# Patient Record
Sex: MALE | Race: BLACK OR AFRICAN AMERICAN
[De-identification: names, ages, dates, MRNs, and addresses within clinical notes are randomized per-mention and may not be internally consistent; named-entity substitution may affect disease eponyms.]

---

## 2021-01-27 ENCOUNTER — HOSPITAL ENCOUNTER (EMERGENCY)
Dept: HOSPITAL 62 - ER | Age: 56
Discharge: HOME | End: 2021-01-27
Payer: COMMERCIAL

## 2021-01-27 VITALS — DIASTOLIC BLOOD PRESSURE: 78 MMHG | SYSTOLIC BLOOD PRESSURE: 173 MMHG

## 2021-01-27 DIAGNOSIS — F17.200: ICD-10-CM

## 2021-01-27 DIAGNOSIS — L03.011: Primary | ICD-10-CM

## 2021-01-27 LAB
ADD MANUAL DIFF: NO
ALBUMIN SERPL-MCNC: 4.7 G/DL (ref 3.5–5)
ALP SERPL-CCNC: 96 U/L (ref 38–126)
ANION GAP SERPL CALC-SCNC: 9 MMOL/L (ref 5–19)
AST SERPL-CCNC: 45 U/L (ref 17–59)
BASE EXCESS BLDV CALC-SCNC: 2.2 MMOL/L
BASOPHILS # BLD AUTO: 0 10^3/UL (ref 0–0.2)
BASOPHILS NFR BLD AUTO: 0.5 % (ref 0–2)
BILIRUB DIRECT SERPL-MCNC: 0.2 MG/DL (ref 0–0.4)
BILIRUB SERPL-MCNC: 0.8 MG/DL (ref 0.2–1.3)
BUN SERPL-MCNC: 9 MG/DL (ref 7–20)
CALCIUM: 9.7 MG/DL (ref 8.4–10.2)
CHLORIDE SERPL-SCNC: 100 MMOL/L (ref 98–107)
CO2 SERPL-SCNC: 28 MMOL/L (ref 22–30)
EOSINOPHIL # BLD AUTO: 0 10^3/UL (ref 0–0.6)
EOSINOPHIL NFR BLD AUTO: 0.1 % (ref 0–6)
ERYTHROCYTE [DISTWIDTH] IN BLOOD BY AUTOMATED COUNT: 13.8 % (ref 11.5–14)
GLUCOSE SERPL-MCNC: 112 MG/DL (ref 75–110)
HCO3 BLDV-SCNC: 29.1 MMOL/L (ref 20–32)
HCT VFR BLD CALC: 47.2 % (ref 37.9–51)
HGB BLD-MCNC: 15.6 G/DL (ref 13.5–17)
INR PPP: 0.96
LYMPHOCYTES # BLD AUTO: 1.6 10^3/UL (ref 0.5–4.7)
LYMPHOCYTES NFR BLD AUTO: 22.5 % (ref 13–45)
MCH RBC QN AUTO: 28.9 PG (ref 27–33.4)
MCHC RBC AUTO-ENTMCNC: 33 G/DL (ref 32–36)
MCV RBC AUTO: 88 FL (ref 80–97)
MONOCYTES # BLD AUTO: 0.7 10^3/UL (ref 0.1–1.4)
MONOCYTES NFR BLD AUTO: 9.6 % (ref 3–13)
NEUTROPHILS # BLD AUTO: 4.7 10^3/UL (ref 1.7–8.2)
NEUTS SEG NFR BLD AUTO: 67.3 % (ref 42–78)
PCO2 BLDV: 53.4 MMHG (ref 35–63)
PH BLDV: 7.35 [PH] (ref 7.3–7.42)
PLATELET # BLD: 211 10^3/UL (ref 150–450)
POTASSIUM SERPL-SCNC: 4.1 MMOL/L (ref 3.6–5)
PROT SERPL-MCNC: 8.3 G/DL (ref 6.3–8.2)
PROTHROMBIN TIME: 13 SEC (ref 11.4–15.4)
RBC # BLD AUTO: 5.4 10^6/UL (ref 4.35–5.55)
TOTAL CELLS COUNTED % (AUTO): 100 %
WBC # BLD AUTO: 6.9 10^3/UL (ref 4–10.5)

## 2021-01-27 PROCEDURE — 83605 ASSAY OF LACTIC ACID: CPT

## 2021-01-27 PROCEDURE — 99285 EMERGENCY DEPT VISIT HI MDM: CPT

## 2021-01-27 PROCEDURE — 85610 PROTHROMBIN TIME: CPT

## 2021-01-27 PROCEDURE — 10060 I&D ABSCESS SIMPLE/SINGLE: CPT

## 2021-01-27 PROCEDURE — 82803 BLOOD GASES ANY COMBINATION: CPT

## 2021-01-27 PROCEDURE — 93010 ELECTROCARDIOGRAM REPORT: CPT

## 2021-01-27 PROCEDURE — 80053 COMPREHEN METABOLIC PANEL: CPT

## 2021-01-27 PROCEDURE — 82962 GLUCOSE BLOOD TEST: CPT

## 2021-01-27 PROCEDURE — 36415 COLL VENOUS BLD VENIPUNCTURE: CPT

## 2021-01-27 PROCEDURE — 73130 X-RAY EXAM OF HAND: CPT

## 2021-01-27 PROCEDURE — 93005 ELECTROCARDIOGRAM TRACING: CPT

## 2021-01-27 PROCEDURE — 85025 COMPLETE CBC W/AUTO DIFF WBC: CPT

## 2021-01-27 PROCEDURE — 87040 BLOOD CULTURE FOR BACTERIA: CPT

## 2021-01-27 NOTE — EKG REPORT
SEVERITY:- BORDERLINE ECG -

SINUS RHYTHM

LEFT AXIS DEVIATION

BORDERLINE T WAVE ABNORMALITIES

:

Confirmed by: Bhavesh Hooper MD 27-Jan-2021 17:39:50

## 2021-01-27 NOTE — RADIOLOGY REPORT (SQ)
EXAM DESCRIPTION:  HAND RIGHT 3 VIEWS



IMAGES COMPLETED DATE/TIME:  1/27/2021 1:20 pm



REASON FOR STUDY:  osteomyelitis



COMPARISON:  None.



EXAM PARAMETERS:  NUMBER OF VIEWS: Three views.

TECHNIQUE: AP, lateral and oblique  radiographic images acquired of the right hand.

LIMITATIONS: None.



FINDINGS:  MINERALIZATION: Normal.

BONES: No acute fracture or dislocation.  No worrisome bone lesions.

JOINTS: Minimal degenerative changes with slight joint space narrowing and bony spurring of the dista
l interphalangeal joints.  Joint spaces are otherwise intact.

SOFT TISSUES: No soft tissue swelling.  No foreign body.

OTHER: No other significant finding.



IMPRESSION:  No acute radiographic abnormality.



TECHNICAL DOCUMENTATION:  JOB ID:  [id number]

 2011 [employer]- All Rights Reserved



Reading location - IP/workstation name: [email or web address]

## 2021-01-27 NOTE — ER DOCUMENT REPORT
ED General





- General


Chief Complaint: Thumb Injury


Stated Complaint: THUMB PAIN


Time Seen by Provider: 01/27/21 16:07


Notes: 





HPI: Patient is a 56-year-old male who presents today with some pain and 

swelling to his right thumb.  This started around 2 days ago.  Atraumatic.  

Today he did state some body aches with a low-grade fever.  He did not take any 

Tylenol or ibuprofen.  He denies any pain to any other joint or other skin 

lesions to any other location.  He denies currently to me any headache, neck 

pain, cough or shortness of breath, abdominal pain, or back pain.  No history of

skin infections previously.








ROS:  See HPI


All other review of systems reviewed and otherwise negative





Reviewed vital signs and nursing note as charted by RN.





PHYSICAL EXAM: 





CONSTITUTIONAL: Alert and oriented and responds appropriately to questions. We

ll-appearing; well-nourished





HEAD: Normocephalic; atraumatic





EYES: PERRL; Conjunctivae clear, sclerae non-icteric





ENT: No intraoral lesions present





NECK: Supple without meningismus; non-tender; no cervical lymphadenopathy, no 

masses





CARD: Regular rate and rhythm; no murmurs; symmetric distal pulses





RESP: Normal chest excursion without splinting or tachypnea; breath sounds clear

and equal bilaterally





ABD/GI: Normal bowel sounds; non-distended; soft, non-tender





BACK: The back appears normal and is non-tender to palpation along the midline 

spine





EXT: Patient has some swelling and fluctuance to the fat pad of the right thumb 

that is very tender to palpation.  No other lesions present





SKIN: No palm or sole lesions present





NEURO: Full range of motion of all 4 extremities








- Related Data


Allergies/Adverse Reactions: 


                                        





No Known Allergies Allergy (Unverified 01/27/21 16:04)


   








Home Medications: denies





Past Medical History





- Social History


Smoking Status: Current Some Day Smoker


Chew tobacco use (# tins/day): No


Frequency of alcohol use: Occasional


Drug Abuse: None


Family History: Reviewed & Not Pertinent


Patient has homicidal ideation: No





Physical Exam





- Vital signs


Vitals: 


                                        











Temp Pulse Resp BP Pulse Ox


 


 100.2 F   103 H  20   195/106 H  95 


 


 01/27/21 15:57  01/27/21 15:57  01/27/21 15:57  01/27/21 15:57  01/27/21 15:57














Course





- Re-evaluation


Re-evalutation: 





01/27/21 18:41


Given the above history and physical examination with what appears to be an 

infected thumb, sepsis orders were ordered in triage given the low-grade fever 

as well as an x-ray of the right thumb was performed.  White blood cell count 

and lactic acid as recorded.  X-ray as recorded.  I will perform a digital block

of the finger and attempt to perform an incision and drainage of what this 

appears to be a felon of the right thumb.





01/27/21 19:35


I performed an incision and drainage of the felon as discussed.  I have spoken 

to orthopedic surgery to help expedite follow-up.  Antibiotics have been p

rovided.





- Vital Signs


Vital signs: 


                                        











Temp Pulse Resp BP Pulse Ox


 


 99.9 F   103 H  20   195/106 H  95 


 


 01/27/21 18:36  01/27/21 15:57  01/27/21 15:57  01/27/21 15:57  01/27/21 15:57














- Laboratory Results


Result Diagrams: 


                                 01/27/21 17:40





                                 01/27/21 17:40


Laboratory Results Interpreted: 


                                        











  01/27/21





  17:40


 


Sodium  136.7 L


 


Glucose  112 H


 


ALT  64 H


 


Total Protein  8.3 H











Critical Laboratory Results Reviewed: No Critical Results





- Radiology Results


Critical Radiology Results Reviewed: No Critical Results





Procedures





- Incision and Drainage


  ** Right Thumb


Type: Simple


Anesthetic type: 1% Lidocaine


mL's of anesthetic: 8


Blade size: 11


I&D procedure: Betadine prep applied


Incision Method: Incision made by scalpel


Notes: 





01/27/21 19:34


I performed a digital block using lidocaine without epinephrine and performed an

incision and drainage of the felon.








Discharge





- Discharge


Clinical Impression: 


 Felon of finger





Condition: Good


Disposition: HOME, SELF-CARE


Additional Instructions: 


Come back immediately for any increased pain, redness, swelling, fever, 

vomiting, or any other acute problems.  Please make sure that you keep the area 

clean and covered.  You may place your thumb and Epson salts twice daily.  

Please take the antibiotics as prescribed.  Please call the orthopedic phone 

number that we have provided and tell them that you were seen in the emergency 

department and Dr. Pabon wanted to make sure that she was seen at the end of 

the week.  Please make sure that you recheck your blood pressure as it was 

slightly elevated while you were here in the hospital.


Prescriptions: 


Sulfamethoxazole/Trimethoprim [Bactrim Ds Tablet] 1 each PO BID 10 Days #20 

tablet


Cephalexin Monohydrate [Keflex 500 mg Capsule] 500 mg PO Q8H 5 Days #30 capsule


Hydrocodone/Acetaminophen [Norco 5-325 mg Tablet] 1 tab PO Q6H 3 Days #10 tablet


Hydrocodone/Acetaminophen [Norco 5-325 mg Tablet] 1 tab PO Q8 #10 tablet


Hydrocodone/Acetaminophen [Norco 5-325 mg Tablet] 1 tab PO Q8 #10 tablet


Referrals: 


JOSEY PABON DO [ACTIVE STAFF] - Follow up as needed

## 2021-01-27 NOTE — ER DOCUMENT REPORT
ED Medical Screen (RME)





- General


Chief Complaint: Thumb Injury


Stated Complaint: THUMB PAIN


Time Seen by Provider: 01/27/21 16:07





- HPI


Notes: 





01/27/21 17:05


56-year-old male presents to ED for evaluation of swelling redness and warmth to

the right thumb.  Notes this been going on the last several days.  Patient 

states that he also noted changes of his nailbed.  Also reports his blood 

pressure is up and he feels generally unwell.  Denies history of this in the 

past.  Denies trauma or injury to the hand.





- Related Data


Allergies/Adverse Reactions: 


                                        





No Known Allergies Allergy (Unverified 01/27/21 16:04)


   








Home Medications: denies





Past Medical History





- Social History


Chew tobacco use (# tins/day): No


Frequency of alcohol use: Occasional


Drug Abuse: None





Physical Exam





- Vital signs


Vitals: 





                                        











Temp Pulse Resp BP Pulse Ox


 


 100.2 F   103 H  20   195/106 H  95 


 


 01/27/21 15:57  01/27/21 15:57  01/27/21 15:57  01/27/21 15:57  01/27/21 15:57








General: No acute distress. Alert and oriented x3.


Skin: No jaundice, pallor, or rashes. Warm and dry.


Musculoskeletal: Right  Hand: Edema, swelling, warmth of the right thumb into 

the hand.  Tenderness to palpation.  Discoloration of the nail.  No secchymosis,

deformities or rashes. Strength and sensation is intact. No tenderness to 

palpation. No tenderness about anatomical snuffbox. Digits 1-5 with good flexion

and extension about MCP, PIP and DIP joints. Brisk capillary refill. Radial 

pulses 2+ bilaterally. Wrist nontender with good range of motion. 


Neuro: GCS 15. 





Course





- Vital Signs


Vital signs: 





                                        











Temp Pulse Resp BP Pulse Ox


 


 100.2 F   103 H  20   195/106 H  95 


 


 01/27/21 15:57  01/27/21 15:57  01/27/21 15:57  01/27/21 15:57  01/27/21 15:57

## 2021-01-27 NOTE — ER DOCUMENT REPORT
ED Hand/Wrist Injury





- General


Chief Complaint: Thumb Injury


Stated Complaint: THUMB PAIN


Time Seen by Provider: 01/27/21 16:07





- Related Data


Allergies/Adverse Reactions: 


                                        





No Known Allergies Allergy (Unverified 01/27/21 16:04)


   








Home Medications: denies





Past Medical History





- Social History


Smoking Status: Current Some Day Smoker


Chew tobacco use (# tins/day): No


Frequency of alcohol use: Occasional


Drug Abuse: None


Patient has homicidal ideation: No





Physical Exam





- Vital signs


Vitals: 





                                        











Temp Pulse Resp BP Pulse Ox


 


 100.2 F   103 H  20   195/106 H  95 


 


 01/27/21 15:57  01/27/21 15:57  01/27/21 15:57  01/27/21 15:57  01/27/21 15:57














Course





- Vital Signs


Vital signs: 





                                        











Temp Pulse Resp BP Pulse Ox


 


 100.2 F   103 H  20   195/106 H  95 


 


 01/27/21 15:57  01/27/21 15:57  01/27/21 15:57  01/27/21 15:57  01/27/21 15:57